# Patient Record
Sex: FEMALE | HISPANIC OR LATINO | URBAN - METROPOLITAN AREA
[De-identification: names, ages, dates, MRNs, and addresses within clinical notes are randomized per-mention and may not be internally consistent; named-entity substitution may affect disease eponyms.]

---

## 2019-11-18 ENCOUNTER — EMERGENCY (EMERGENCY)
Facility: HOSPITAL | Age: 29
LOS: 1 days | Discharge: HOME | End: 2019-11-18
Admitting: EMERGENCY MEDICINE
Payer: MEDICAID

## 2019-11-18 VITALS
WEIGHT: 231.93 LBS | DIASTOLIC BLOOD PRESSURE: 79 MMHG | TEMPERATURE: 98 F | HEART RATE: 65 BPM | OXYGEN SATURATION: 95 % | RESPIRATION RATE: 18 BRPM | SYSTOLIC BLOOD PRESSURE: 158 MMHG

## 2019-11-18 PROCEDURE — 99282 EMERGENCY DEPT VISIT SF MDM: CPT
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
## 2019-11-18 NOTE — ED PROVIDER NOTE - CARE PROVIDERS DIRECT ADDRESSES
,[email or web address]

## 2019-11-18 NOTE — ED PROVIDER NOTE - NSFOLLOWUPINSTRUCTIONS_ED_ALL_ED_FT
please follow up with ENT     Eardrum Rupture  Image   An eardrum rupture is a tear (perforation) that makes a hole in the eardrum. The eardrum is a thin, round tissue inside the ear. It allows you to hear. This condition often heals on its own. There is often little or no long-term hearing loss.  Eardrum rupture can be caused by an infection, an injury, long-term ear problems, or surgery on the ear. This condition can cause you to have:  Pain.Ringing in the ear.Fluid leaking from the ear.Hearing loss.Dizziness.Follow these instructions at home:  Medicines     Take over-the-counter and prescription medicines only as told by your doctor.If you were prescribed an antibiotic medicine, use it as told by your doctor. Do not stop using the antibiotic even if you start to feel better.Caring for your ear     Keep your ear dry while it heals:  Do not let your head go under water. Do not swim or dive until your doctor says it is okay.Before you take a bath or shower, place a waterproof earplug in your ear to keep water out of your ear.When you play sports in which ear injuries may happen, wear a headgear with ear protection.Managing pain and swelling     If told, apply heat to the affected ear. Use a moist heat pack, or a heating pad, or another heat source that your doctor tells you.   Place a towel between your skin and the heat source. Leave the heat on for 20–30 minutes. Remove the heat if your skin turns bright red. You must remove the heat if you are unable to feel pain, heat, or cold. You are more likely to get burned.General instructions     Continue your normal activities after your eardrum heals. Your doctor will tell you when your eardrum has healed.Talk to your doctor before you fly on an airplane.Keep all follow-up visits as told by your doctor. This is important.Contact a doctor if you:  Have mucus, blood, or pus leaking from your ear.Have a fever.Have ear pain.Cannot hear.Have ringing in the ear.Feel dizzy.Get help right away if you:  Have sudden hearing loss.Become very dizzy.Get very bad pain in your ear.Have weakness in your face.Cannot move parts of your face.Summary  An eardrum rupture is a tear that makes a hole in the eardrum.The eardrum will likely heal on its own within a few weeks.Follow instructions from your doctor about how to keep your ear dry and protected as it heals.This information is not intended to replace advice given to you by your health care provider. Make sure you discuss any questions you have with your health care provider.    Document Released: 06/07/2011 Document Revised: 01/04/2019 Document Reviewed: 01/04/2019  Elsevier Interactive Patient Education © 2019 [employer]

## 2019-11-18 NOTE — ED PROVIDER NOTE - CARE PROVIDER_API CALL
Rachel Rosario)  Otolaryngology  [address] [place] [address], [address]  [place], [place] [address]  Phone: [phone]  Fax: [phone]  Follow Up Time:
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
## 2019-11-18 NOTE — ED PROVIDER NOTE - PHYSICAL EXAMINATION
HEENT: right small tm perforation. Pupils equal, round and reactive to light and accommodation. Extra ocular movements intact. No evidence of nystagmus, conjunctival injection or jaundice. Nose symmetric, non-tender without discharge. Nares muscosa moist without evidence of erythema. Moist mucous membranes of oropharyx. No evidence of erythema, edema, petichiae, exudates or tonsillar enlargement. External ears are symmetric, non-tender. No discharge noted. No evidence of hemotympanum, retraction or bulge. Oropharynx moist mucous membranes. Teeth in good repair. Uvula midline. Posterior oropharynx without erythema, edema, tonsillar enlargement or exudates. Neck supple without evidence of lymphadenopathy    Skin: Warm dry and intact. No note of any edema, pallor, jaundice, erythema, ecchymosis, or purpura    Psych: Mood and affect appropriate

## 2019-11-18 NOTE — ED PROVIDER NOTE - PATIENT PORTAL LINK FT
You can access the FollowMyHealth Patient Portal offered by [facility] by registering at the following website: [email or web address]. By joining [facility]’s FollowMyHealth portal, you will also be able to view your health information using other applications (apps) compatible with our system.
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
## 2019-11-19 PROBLEM — Z00.00 ENCOUNTER FOR PREVENTIVE HEALTH EXAMINATION: Status: ACTIVE | Noted: 2019-11-19

## 2019-11-19 PROBLEM — Z78.9 OTHER SPECIFIED HEALTH STATUS: Chronic | Status: ACTIVE | Noted: 2019-11-18

## 2019-11-24 DIAGNOSIS — H72.91 UNSPECIFIED PERFORATION OF TYMPANIC MEMBRANE, RIGHT EAR: ICD-10-CM

## 2019-11-27 ENCOUNTER — APPOINTMENT (OUTPATIENT)
Dept: OTOLARYNGOLOGY | Facility: CLINIC | Age: 29
End: 2019-11-27

## 2022-03-03 NOTE — ED ADULT NURSE NOTE - CAS TRG GENERAL NORM CIRC DET
Strong peripheral pulses
pt with loose stool x3-4 days.  denies N/V or fevers.  tolerating PO, +UOP, pt awake and alert, playful. denies pain.  no pmhx no known allergies.